# Patient Record
Sex: FEMALE | Race: OTHER | Employment: OTHER | URBAN - METROPOLITAN AREA
[De-identification: names, ages, dates, MRNs, and addresses within clinical notes are randomized per-mention and may not be internally consistent; named-entity substitution may affect disease eponyms.]

---

## 2023-05-19 NOTE — PATIENT DISCUSSION
The patient has narrow angles that are not occludable at this time. Periodic examinations have been recommended to the patient. Angle closure symptoms and signs have been reviewed. No

## 2024-07-29 ENCOUNTER — NEW PATIENT (OUTPATIENT)
Dept: URBAN - METROPOLITAN AREA CLINIC 43 | Facility: CLINIC | Age: 69
End: 2024-07-29

## 2024-07-29 DIAGNOSIS — H26.493: ICD-10-CM

## 2024-07-29 DIAGNOSIS — H52.4: ICD-10-CM

## 2024-07-29 PROCEDURE — 92004 COMPRE OPH EXAM NEW PT 1/>: CPT

## 2024-07-29 PROCEDURE — 92015 DETERMINE REFRACTIVE STATE: CPT

## 2024-07-29 ASSESSMENT — VISUAL ACUITY
OD_SC: J8-
OS_CC: J1
OS_SC: J8
OD_CC: J2
OS_SC: 20/25
OD_CC: 20/25-1
OD_SC: 20/25-1
OS_CC: 20/25-1

## 2024-07-29 ASSESSMENT — TONOMETRY
OD_IOP_MMHG: 16
OS_IOP_MMHG: 16

## 2024-08-28 ENCOUNTER — CONSULTATION/EVALUATION (OUTPATIENT)
Dept: URBAN - METROPOLITAN AREA CLINIC 39 | Facility: CLINIC | Age: 69
End: 2024-08-28

## 2024-08-28 DIAGNOSIS — H26.493: ICD-10-CM

## 2024-08-28 PROCEDURE — 99213 OFFICE O/P EST LOW 20 MIN: CPT | Mod: 57

## 2024-08-28 ASSESSMENT — TONOMETRY
OD_IOP_MMHG: 13
OS_IOP_MMHG: 11

## 2024-08-28 ASSESSMENT — VISUAL ACUITY
OD_BAT: 20/50 W/ MR
OS_BAT: 20/60 W/ MR
OD_CC: 20/30-1
OS_CC: 20/30-1

## 2024-09-25 ENCOUNTER — POST-OP (OUTPATIENT)
Dept: URBAN - METROPOLITAN AREA CLINIC 43 | Facility: CLINIC | Age: 69
End: 2024-09-25

## 2024-09-25 DIAGNOSIS — Z98.890: ICD-10-CM

## 2024-09-25 PROCEDURE — 99024 POSTOP FOLLOW-UP VISIT: CPT
